# Patient Record
Sex: MALE | URBAN - METROPOLITAN AREA
[De-identification: names, ages, dates, MRNs, and addresses within clinical notes are randomized per-mention and may not be internally consistent; named-entity substitution may affect disease eponyms.]

---

## 2022-04-28 ENCOUNTER — APPOINTMENT (OUTPATIENT)
Dept: URBAN - METROPOLITAN AREA CLINIC 255 | Age: 72
Setting detail: DERMATOLOGY
End: 2022-04-29

## 2022-04-28 PROBLEM — C44.319 BASAL CELL CARCINOMA OF SKIN OF OTHER PARTS OF FACE: Status: ACTIVE | Noted: 2022-04-28

## 2022-04-28 PROCEDURE — OTHER MOHS SURGERY: OTHER

## 2022-04-28 PROCEDURE — OTHER MIPS QUALITY: OTHER

## 2022-04-28 PROCEDURE — 13132 CMPLX RPR F/C/C/M/N/AX/G/H/F: CPT

## 2022-04-28 PROCEDURE — 17311 MOHS 1 STAGE H/N/HF/G: CPT

## 2022-04-28 NOTE — PROCEDURE: MIPS QUALITY
CC: This 73 y.o. Black or  female  is here for evaluation of  T2DM along with comorbidities indicated in the Visit Diagnosis section of this encounter.    HPI: Mena Odonnell was diagnosed with T2DM in ~ 1995. Experienced blurry vision at the time of diagnosis r/t BG >400. Started on orals. Began insulin in 2005. She is currently on MDI.    Last seen by IZABEL Landaverde NP, in 3/2019. New to me.   C/o headache x 3 weeks.  BP is high today at 185/83.   She stopped Invokana this year because of concerns seeing the [place]. Denies  infection, dizziness, or dry mouth on Invokana. Cannot tell if it helped her BGs or not.     C/o tenderness and redness to the back of her neck.     LAST DIABETES EDUCATION: 6/2019 mira Palmer - Found visit helpful     HOSPITALIZED FOR DIABETES -  No.    PRESCRIBED DIABETES MEDICATIONS: Ozempic 0.5 mg once weekly, Toujeo Max 100 units once daily in 6 am, Novolog 26 units ac     Misses medication doses - Yes - sometimes forgets to take Novolog before lunch, less than 1x/week.     DM COMPLICATIONS: peripheral neuropathy    SIGNIFICANT DIABETES MED HISTORY:   Metformin stopped December 2016 r/t GI upset and diarrhea     SELF MONITORING BLOOD GLUCOSE: Checks blood glucose at home 0-1x/day. FBG today was 102. FBGs ranges 102-120s. Forgot her meter.     HYPOGLYCEMIC EPISODES: about 1x/week usually before lunch; corrects with glucose tablets if she feels really bad, or she eats a meal.      CURRENT DIET: no longer drinking sodas; drinking water now.   Breakfast ~ 8 am; lunch ~ 12 pm.   Breakfast is usually a sandwich, coffee; supper yesterday was a [employer] dinner plus a small piece of fish and shrimp.      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 
    ROS:   CONSTITUTIONAL: Appetite good, denies fatigue  RESPIRATORY: + TRACY x 4 weeks at least --even getting dressed   OTHER: + headache         PHYSICAL EXAM:  GENERAL: Well developed, well nourished. No acute distress.   PSYCH: AAOx3, appropriate mood and affect, conversant, well-groomed. Judgement and insight good.   NEURO: Cranial nerves grossly intact. Speech clear, no tremor.   NECK: + abscess to back of her neck, TTP   CHEST: Respirations even and unlabored. CTA bilaterally.  CARDIOVASCULAR: Regular rate and rhythm. No bruits. No murmur. No edema.   ABDOMEN: Soft, non-tender, non-distended. Bowel sounds present.   MS: Gait steady. No clubbing.   SKIN: Normal skin turgor. Skin warm and dry. No areas of breakdown. + nuchal acanthosis nigricans.       Hemoglobin A1C   Date Value Ref Range Status   09/07/2019 6.5 (H) 4.0 - 5.6 % Final     Comment:     ADA Screening Guidelines:  5.7-6.4%  Consistent with prediabetes  >or=6.5%  Consistent with diabetes  High levels of fetal hemoglobin interfere with the HbA1C  assay. Heterozygous hemoglobin variants (HbS, HgC, etc)do  not significantly interfere with this assay.   However, presence of multiple variants may affect accuracy.     03/13/2019 6.5 (H) 4.0 - 5.6 % Final     Comment:     ADA Screening Guidelines:  5.7-6.4%  Consistent with prediabetes  >or=6.5%  Consistent with diabetes  High levels of fetal hemoglobin interfere with the HbA1C  assay. Heterozygous hemoglobin variants (HbS, HgC, etc)do  not significantly interfere with this assay.   However, presence of multiple variants may affect accuracy.     11/17/2018 7.5 (H) 4.0 - 5.6 % Final     Comment:     ADA Screening Guidelines:  5.7-6.4%  Consistent with prediabetes  >or=6.5%  Consistent with diabetes  High levels of fetal hemoglobin interfere with the HbA1C  assay. Heterozygous hemoglobin variants (HbS, HgC, etc)do  not significantly interfere with this assay.   However, presence of multiple variants may 
affect accuracy.             Chemistry        Component Value Date/Time     09/07/2019 0858    K 4.2 09/07/2019 0858     09/07/2019 0858    CO2 26 09/07/2019 0858    BUN 16 09/07/2019 0858    CREATININE 0.7 09/07/2019 0858     09/07/2019 0858        Component Value Date/Time    CALCIUM 9.7 09/07/2019 0858    ALKPHOS 68 03/09/2019 0833    AST 17 03/09/2019 0833    ALT 19 03/09/2019 0833    BILITOT 0.4 03/09/2019 0833    ESTGFRAFRICA >60.0 09/07/2019 0858    EGFRNONAA >60.0 09/07/2019 0858          Lab Results   Component Value Date    LDLCALC 165.8 (H) 09/07/2019          Ref. Range 9/7/2019 08:58   Cholesterol Latest Ref Range: 120 - 199 mg/dL 240 (H)   HDL Latest Ref Range: 40 - 75 mg/dL 43   Hdl/Cholesterol Ratio Latest Ref Range: 20.0 - 50.0 % 17.9 (L)   LDL Cholesterol External Latest Ref Range: 63.0 - 159.0 mg/dL 165.8 (H)   Non-HDL Cholesterol Latest Units: mg/dL 197   Total Cholesterol/HDL Ratio Latest Ref Range: 2.0 - 5.0  5.6 (H)   Triglycerides Latest Ref Range: 30 - 150 mg/dL 156 (H)       Lab Results   Component Value Date    MICALBCREAT 7.9 09/29/2016               ASSESSMENT and PLAN:    A1C GOAL:     1. Type 2 diabetes mellitus with diabetic polyneuropathy, with long-term current use of insulin  Resume Invokana 100 mg once daily   Reduce Novolog to 20 units before breakfast.   Reduce Toujeo to 90 units once daily in the morning.   Increase Ozempic to 1 mg once weekly. -- she has 2 more pens of 0.5 mg dose however.     Monitor blood sugar before meals and bedtime.     Difficult to optimize insulin doses since blood sugar logs not available.   A1c of 6.5% indicates controlled diabetes but does raise suspicion of hypoglycemia.     rtc in 3 mo with labs prior.     Hemoglobin A1c   2. Abscess  F/u with Primary Care.    3. Morbid obesity with body mass index (BMI) of 40.0 or higher  Increases insulin resistance.      4. Essential hypertension  Please start checking blood pressure 
1-2x/day. Keep a log.   Follow up with your PCP for blood pressure control and headache.        Orders Placed This Encounter   Procedures    Hemoglobin A1c     Standing Status:   Future     Standing Expiration Date:   11/10/2020        Follow up in about 3 months (around 12/12/2019).     Thank you very much for allowing me to participate in Mena Odonnell's care.         
Quality 110: Preventive Care And Screening: Influenza Immunization: Influenza Immunization not Administered because Patient Refused.
Quality 226: Preventive Care And Screening: Tobacco Use: Screening And Cessation Intervention: Patient screened for tobacco use and is an ex/non-smoker
Detail Level: Detailed
Quality 130: Documentation Of Current Medications In The Medical Record: Current Medications Documented
Quality 143: Oncology: Medical And Radiation- Pain Intensity Quantified: Pain severity quantified, no pain present
Quality 111:Pneumonia Vaccination Status For Older Adults: Pneumococcal Vaccination not Administered or Previously Received, Reason not Otherwise Specified
Quality 431: Preventive Care And Screening: Unhealthy Alcohol Use - Screening: Patient not identified as an unhealthy alcohol user when screened for unhealthy alcohol use using a systematic screening method

## 2022-04-28 NOTE — PROCEDURE: MOHS SURGERY
Body Location Override (Optional - Billing Will Still Be Based On Selected Body Map Location If Applicable): Right Mid Mandibular Cheek

## 2022-04-28 NOTE — PROCEDURE: MOHS SURGERY
patient Advancement Flap (Single) Text: In order to preserve normal anatomic and functional relationships, a single advancement flap was deemed the most appropriate reconstructive procedure.  The beveled edges of the Mohs defect were excised with a #15 scalpel blade.    The flap was designed to fall along relaxed skin tension lines and/or free margins, incised, and elevated using blunt curved tissue scissors.  Hemostasis was achieved.  Interrupted buried vertical mattress sutures were employed to secure the flap in place.  Redundant cutaneous columns defined by the flap's movement were removed to the adipose layer using the triangulation technique and repaired in similar fashion.  Final epidermal approximation along the length of the flap was achieved using epidermal sutures.  The wound was stressed in various directions to ensure the adequacy of the closure and of hemostasis.  The flap edges appeared pink and well perfused.  Reconstruction was considered complete.

## 2022-05-05 ENCOUNTER — APPOINTMENT (OUTPATIENT)
Dept: URBAN - METROPOLITAN AREA CLINIC 255 | Age: 72
Setting detail: DERMATOLOGY
End: 2022-05-19

## 2022-05-05 DIAGNOSIS — Z48.02 ENCOUNTER FOR REMOVAL OF SUTURES: ICD-10-CM

## 2022-05-05 PROCEDURE — OTHER MIPS QUALITY: OTHER

## 2022-05-05 PROCEDURE — OTHER SUTURE REMOVAL (GLOBAL PERIOD): OTHER

## 2022-05-05 PROCEDURE — OTHER DIAGNOSIS COMMENT: OTHER

## 2022-05-05 PROCEDURE — 99024 POSTOP FOLLOW-UP VISIT: CPT

## 2022-05-05 PROCEDURE — OTHER COUNSELING: OTHER

## 2022-05-05 ASSESSMENT — LOCATION SIMPLE DESCRIPTION DERM
LOCATION SIMPLE: RIGHT UPPER BACK
LOCATION SIMPLE: RIGHT CHEEK
LOCATION SIMPLE: RIGHT UPPER BACK

## 2022-05-05 ASSESSMENT — LOCATION ZONE DERM
LOCATION ZONE: TRUNK
LOCATION ZONE: FACE
LOCATION ZONE: TRUNK

## 2022-05-05 ASSESSMENT — LOCATION DETAILED DESCRIPTION DERM
LOCATION DETAILED: RIGHT SUPERIOR MEDIAL UPPER BACK
LOCATION DETAILED: RIGHT LATERAL BUCCAL CHEEK
LOCATION DETAILED: RIGHT SUPERIOR UPPER BACK

## 2022-05-05 NOTE — PROCEDURE: DIAGNOSIS COMMENT
Comment: Excision by Katrina Goldman MD (Approved to take stitches out today).
Comment: S/P MMS for Recurrent Nodular Basal Cell Carcinoma on (04/28/22)
Detail Level: Simple

## 2022-05-05 NOTE — PROCEDURE: SUTURE REMOVAL (GLOBAL PERIOD)
Add 29124 Cpt? (Important Note: In 2017 The Use Of 55862 Is Being Tracked By Cms To Determine Future Global Period Reimbursement For Global Periods): no
Detail Level: Detailed
Body Location Override (Optional - Billing Will Still Be Based On Selected Body Map Location If Applicable): Right Mid Mandibular Cheek

## 2022-05-05 NOTE — PROCEDURE: MIPS QUALITY
Quality 111:Pneumonia Vaccination Status For Older Adults: Pneumococcal Vaccination not Administered or Previously Received, Reason not Otherwise Specified
Quality 110: Preventive Care And Screening: Influenza Immunization: Influenza Immunization not Administered because Patient Refused.
Detail Level: Detailed
Quality 130: Documentation Of Current Medications In The Medical Record: Current Medications Documented
Quality 431: Preventive Care And Screening: Unhealthy Alcohol Use - Screening: Patient not identified as an unhealthy alcohol user when screened for unhealthy alcohol use using a systematic screening method
Quality 226: Preventive Care And Screening: Tobacco Use: Screening And Cessation Intervention: Patient screened for tobacco use and is an ex/non-smoker

## 2022-05-10 ENCOUNTER — APPOINTMENT (OUTPATIENT)
Dept: URBAN - METROPOLITAN AREA CLINIC 255 | Age: 72
Setting detail: DERMATOLOGY
End: 2022-05-19

## 2022-05-10 PROBLEM — C44.329 SQUAMOUS CELL CARCINOMA OF SKIN OF OTHER PARTS OF FACE: Status: ACTIVE | Noted: 2022-05-10

## 2022-05-10 PROBLEM — C44.42 SQUAMOUS CELL CARCINOMA OF SKIN OF SCALP AND NECK: Status: ACTIVE | Noted: 2022-05-10

## 2022-05-10 PROCEDURE — OTHER MOHS SURGERY: OTHER

## 2022-05-10 PROCEDURE — 17311 MOHS 1 STAGE H/N/HF/G: CPT | Mod: 76

## 2022-05-10 PROCEDURE — OTHER MIPS QUALITY: OTHER

## 2022-05-10 PROCEDURE — 17311 MOHS 1 STAGE H/N/HF/G: CPT

## 2022-05-10 NOTE — PROCEDURE: MOHS SURGERY
Body Location Override (Optional - Billing Will Still Be Based On Selected Body Map Location If Applicable): Left Upper Forehead

## 2022-05-10 NOTE — PROCEDURE: MIPS QUALITY
Quality 111:Pneumonia Vaccination Status For Older Adults: Pneumococcal Vaccination not Administered or Previously Received, Reason not Otherwise Specified
Detail Level: Detailed
Quality 143: Oncology: Medical And Radiation- Pain Intensity Quantified: Pain severity quantified, no pain present
Quality 226: Preventive Care And Screening: Tobacco Use: Screening And Cessation Intervention: Patient screened for tobacco use and is an ex/non-smoker
Quality 110: Preventive Care And Screening: Influenza Immunization: Influenza Immunization not Administered because Patient Refused.
Quality 431: Preventive Care And Screening: Unhealthy Alcohol Use - Screening: Patient not identified as an unhealthy alcohol user when screened for unhealthy alcohol use using a systematic screening method
Quality 130: Documentation Of Current Medications In The Medical Record: Current Medications Documented
